# Patient Record
Sex: MALE | Race: ASIAN | NOT HISPANIC OR LATINO | URBAN - METROPOLITAN AREA
[De-identification: names, ages, dates, MRNs, and addresses within clinical notes are randomized per-mention and may not be internally consistent; named-entity substitution may affect disease eponyms.]

---

## 2017-07-16 ENCOUNTER — EMERGENCY (EMERGENCY)
Facility: HOSPITAL | Age: 27
LOS: 1 days | Discharge: HOME | End: 2017-07-16
Admitting: EMERGENCY MEDICINE
Payer: MEDICAID

## 2017-07-16 VITALS
RESPIRATION RATE: 16 BRPM | DIASTOLIC BLOOD PRESSURE: 99 MMHG | TEMPERATURE: 98 F | SYSTOLIC BLOOD PRESSURE: 149 MMHG | OXYGEN SATURATION: 95 % | HEART RATE: 130 BPM | WEIGHT: 188.05 LBS

## 2017-07-16 VITALS
RESPIRATION RATE: 16 BRPM | DIASTOLIC BLOOD PRESSURE: 59 MMHG | SYSTOLIC BLOOD PRESSURE: 109 MMHG | HEART RATE: 94 BPM | OXYGEN SATURATION: 98 %

## 2017-07-16 DIAGNOSIS — S61.012A LACERATION WITHOUT FOREIGN BODY OF LEFT THUMB WITHOUT DAMAGE TO NAIL, INITIAL ENCOUNTER: ICD-10-CM

## 2017-07-16 DIAGNOSIS — Z88.2 ALLERGY STATUS TO SULFONAMIDES: ICD-10-CM

## 2017-07-16 DIAGNOSIS — R11.2 NAUSEA WITH VOMITING, UNSPECIFIED: ICD-10-CM

## 2017-07-16 PROCEDURE — 99283 EMERGENCY DEPT VISIT LOW MDM: CPT | Mod: 25

## 2017-07-16 PROCEDURE — 12001 RPR S/N/AX/GEN/TRNK 2.5CM/<: CPT

## 2017-07-16 RX ORDER — ONDANSETRON 8 MG/1
4 TABLET, FILM COATED ORAL ONCE
Qty: 0 | Refills: 0 | Status: DISCONTINUED | OUTPATIENT
Start: 2017-07-16 | End: 2017-07-20

## 2017-07-16 NOTE — ED PROVIDER NOTE - OBJECTIVE STATEMENT
27 y/o M presents to ED with a laceration to the L thumb pad from a broken glass while at a bar.  He states he dropped his glass, it broke and when he tried to pick it up it sliced his finger causing a lot of bleeding.  He has been drinking alcohol tonight.  His friends confirm he did not have any other injuries.  Last tetanus 3 years ago.

## 2017-07-16 NOTE — ED PROVIDER NOTE - MEDICAL DECISION MAKING DETAILS
25 y/o M presents to ED with finger laceration.  Laceration repaired, bacitracin applied, sterile dressing applied.  Wound care and return precautions advised.

## 2017-07-16 NOTE — ED ADULT TRIAGE NOTE - CHIEF COMPLAINT QUOTE
Pt with laceration to left thumb and left 3rd finger from a broken glass. Per pt's friend, they tried to apply pressure for 10 minutes but were unable to slow down the bleeding.

## 2017-07-16 NOTE — ED ADULT NURSE NOTE - OBJECTIVE STATEMENT
Pt reports cutting his finger on a broken glass. Pt's brother brought him in as bleeding would not stop.

## 2017-07-16 NOTE — ED PROCEDURE NOTE - CPROC ED POST PROC CARE GUIDE1
Verbal/written post procedure instructions were given to patient/caregiver./Instructed patient/caregiver to follow-up with primary care physician./Instructed patient/caregiver regarding signs and symptoms of infection.
Verbal/written post procedure instructions were given to patient/caregiver./Instructed patient/caregiver regarding signs and symptoms of infection./Instructed patient/caregiver to follow-up with primary care physician.